# Patient Record
Sex: MALE | Race: WHITE | NOT HISPANIC OR LATINO | Employment: FULL TIME | URBAN - METROPOLITAN AREA
[De-identification: names, ages, dates, MRNs, and addresses within clinical notes are randomized per-mention and may not be internally consistent; named-entity substitution may affect disease eponyms.]

---

## 2023-09-18 PROBLEM — E10.42 TYPE 1 DIABETES MELLITUS WITH DIABETIC POLYNEUROPATHY (MULTI): Status: ACTIVE | Noted: 2023-09-18

## 2023-09-18 PROBLEM — E78.2 MIXED HYPERLIPIDEMIA: Status: ACTIVE | Noted: 2023-09-18

## 2023-09-18 PROBLEM — I10 ESSENTIAL (PRIMARY) HYPERTENSION: Status: ACTIVE | Noted: 2023-09-18

## 2023-09-18 PROBLEM — E78.00 PURE HYPERCHOLESTEROLEMIA: Status: ACTIVE | Noted: 2023-09-18

## 2023-09-18 PROBLEM — E55.9 VITAMIN D DEFICIENCY: Status: ACTIVE | Noted: 2023-09-18

## 2023-09-18 PROBLEM — E10.65 TYPE 1 DIABETES MELLITUS WITH HYPERGLYCEMIA (MULTI): Status: ACTIVE | Noted: 2023-09-18

## 2023-09-18 RX ORDER — INSULIN LISPRO 100 [IU]/ML
150 INJECTION, SOLUTION INTRAVENOUS; SUBCUTANEOUS
COMMUNITY

## 2023-09-18 RX ORDER — ACETAMINOPHEN 500 MG
1 TABLET ORAL DAILY
COMMUNITY
Start: 2021-05-04

## 2023-09-18 RX ORDER — BLOOD SUGAR DIAGNOSTIC
STRIP MISCELLANEOUS
COMMUNITY
Start: 2019-11-29

## 2023-09-18 RX ORDER — PRAVASTATIN SODIUM 20 MG/1
1 TABLET ORAL DAILY
COMMUNITY

## 2023-09-18 RX ORDER — ATORVASTATIN CALCIUM 40 MG/1
1 TABLET, FILM COATED ORAL DAILY
COMMUNITY

## 2023-09-18 RX ORDER — LISINOPRIL 10 MG/1
1 TABLET ORAL DAILY
COMMUNITY

## 2023-09-18 RX ORDER — INSULIN GLULISINE 100 [IU]/ML
INJECTION, SOLUTION SUBCUTANEOUS
COMMUNITY

## 2023-09-18 RX ORDER — GABAPENTIN 300 MG/1
1 CAPSULE ORAL NIGHTLY
COMMUNITY

## 2023-10-27 ENCOUNTER — PHARMACY VISIT (OUTPATIENT)
Dept: PHARMACY | Facility: CLINIC | Age: 38
End: 2023-10-27
Payer: COMMERCIAL

## 2023-10-27 PROCEDURE — RXMED WILLOW AMBULATORY MEDICATION CHARGE

## 2023-11-20 ENCOUNTER — PHARMACY VISIT (OUTPATIENT)
Dept: PHARMACY | Facility: CLINIC | Age: 38
End: 2023-11-20
Payer: COMMERCIAL

## 2023-11-21 ENCOUNTER — PHARMACY VISIT (OUTPATIENT)
Dept: PHARMACY | Facility: CLINIC | Age: 38
End: 2023-11-21
Payer: COMMERCIAL

## 2023-11-21 PROCEDURE — RXMED WILLOW AMBULATORY MEDICATION CHARGE

## 2023-12-18 PROCEDURE — RXMED WILLOW AMBULATORY MEDICATION CHARGE

## 2023-12-26 ENCOUNTER — PHARMACY VISIT (OUTPATIENT)
Dept: PHARMACY | Facility: CLINIC | Age: 38
End: 2023-12-26
Payer: COMMERCIAL

## 2024-01-15 PROCEDURE — RXMED WILLOW AMBULATORY MEDICATION CHARGE

## 2024-01-24 ENCOUNTER — PHARMACY VISIT (OUTPATIENT)
Dept: PHARMACY | Facility: CLINIC | Age: 39
End: 2024-01-24
Payer: COMMERCIAL

## 2024-02-12 PROCEDURE — RXMED WILLOW AMBULATORY MEDICATION CHARGE

## 2024-02-19 ENCOUNTER — PHARMACY VISIT (OUTPATIENT)
Dept: PHARMACY | Facility: CLINIC | Age: 39
End: 2024-02-19
Payer: COMMERCIAL

## 2024-02-25 NOTE — PROGRESS NOTES
HPI    40 yo previously of Dr. Serrano's practice with Diabetes 1 (dx 2004)(+ neuropathy), HTN, Dyslipidemia. A1c was 6.9%, today 6.6%.            Pt is testing sugars 7 times per day using cgm. Pt is having low sugars <1 times/week usually with activity. Pt is able to afford their medications. Pt is exercising. Pt has gone from a weight of nearly 365lbs to his current wt 250lbs range.           Using medtronic 780G with guardian 4 sensor in automode (was in the upper 7's prior to this current pump), pt has 3 previous medtronic pumps.           I:C ratio 1:2.5, isf-10, target , basal: mn-1.95, 5pm-2.0           30days cgm data: 78% in range, 0% lows, pattern: mid 100's through day, quite consistent. In automode 95% of the time.           Taking lisinopril 20mg daily.           Was taking atorvastatin 40mg daily for lipid, having headache so gave trial of pravastatin 20mg and tolerating.                  Current Outpatient Medications:     atorvastatin (Lipitor) 40 mg tablet, Take 1 tablet (40 mg) by mouth once daily. For 90 days, Disp: , Rfl:     blood sugar diagnostic (Contour Next Test Strips) strip, use to test blood sugar 4 times per day In Vitro Dx: E10.65, Disp: , Rfl:     cholecalciferol (Vitamin D-3) 50 mcg (2,000 unit) capsule, Take 1 capsule (50 mcg) by mouth early in the morning.. For 30 days, Disp: , Rfl:     gabapentin (Neurontin) 300 mg capsule, Take 1 capsule (300 mg) by mouth once daily at bedtime. For 90 days, Disp: , Rfl:     insulin glulisine (Apidra U-100 Insulin) 100 unit/mL injection, use as directed in insulin pump Subcutaneous Max TDD: 150 units, Disp: , Rfl:     insulin lispro (HumaLOG U-100 Insulin) 100 unit/mL injection, Inject 1.5 mL (150 Units) under the skin. For 27 days, Disp: , Rfl:     insulin lispro (HumaLOG) 100 unit/mL injection, USE AS DIRECTED IN INSULIN PUMP UP  UNITS TOTAL A DAY, Disp: 40 mL, Rfl: 11    lisinopril 10 mg tablet, Take 1 tablet (10 mg) by mouth once  "daily. For 90 days, Disp: , Rfl:     pravastatin (Pravachol) 20 mg tablet, Take 1 tablet (20 mg) by mouth once daily. For 30 days, Disp: , Rfl:       Allergies as of 02/27/2024    (No Known Allergies)         Review of Systems   Cardiology: Lightheadedness-denies.  Chest pain-denies.  Leg edema-denies.  Palpitations-denies.  Respiratory: Cough-denies. Shortness of breath-denies.  Wheezing-denies.  Gastroenterology: Constipation-denies.  Diarrhea-denies.  Heartburn-denies.  Endocrinology: Cold intolerance-denies.  Heat intolerance-denies.  Sweats-denies.  Neurology: Headache-denies.  Tremor-denies.  Neuropathy in extremities-feet at times  Psychology: Low energy-denies.  Irritability-denies.  Sleep disturbances-denies.      /87 (BP Location: Right arm, Patient Position: Sitting)   Pulse 80   Wt 116 kg (256 lb 12.8 oz)   BMI 35.82 kg/m²       Labs:  Lab Results   Component Value Date    WBC 6.3 03/24/2022    NRBC 0 11/03/2020    RBC 4.94 03/24/2022    HGB 15.1 03/24/2022    HCT 44.6 03/24/2022     03/24/2022     Lab Results   Component Value Date    CALCIUM 9.5 03/24/2022    AST 14 03/24/2022    ALKPHOS 74 03/24/2022    BILITOT 0.9 03/24/2022    PROT 7.1 03/24/2022    ALBUMIN 4.2 03/24/2022    GLOB 3.0 11/03/2020    AGR 1.4 (L) 11/03/2020     03/24/2022    K 4.4 03/24/2022     03/24/2022    CO2 29 03/24/2022    ANIONGAP 10 03/24/2022    BUN 10 03/24/2022    CREATININE 0.81 03/24/2022    UREACREAUR 17.1 11/03/2020    GLUCOSE 143 (H) 03/24/2022    ALT 20 03/24/2022    EGFR 137 11/03/2020     Lab Results   Component Value Date    CHOL 137 03/24/2022    TRIG 57 03/24/2022    HDL 40.5 03/24/2022    LDLCALC 56 (L) 11/03/2020     Lab Results   Component Value Date    MICROALBCREA SEE COMMENT 03/24/2022     Lab Results   Component Value Date    TSH 0.79 03/24/2022     No results found for: \"[id number]\"  Lab Results   Component Value Date    HGBA1C 6.6 (A) 02/27/2024         Physical Exam   General " Appearance: pleasant, cooperative, no acute distress  HEENT: no chemosis, no proptosis, no lid lag, no lid retraction  Neck: no lymphadenopathy, no thyromegaly, no dominant thyroid nodules  Heart: no murmurs, regular rate and rhythm, S1 and S2  Lungs: no wheezes, no rhonci, no rales  Extremities: no lower extremity swelling      Assessment/Plan   1. Type 1 diabetes mellitus with diabetic polyneuropathy (CMS/HCC)  -A1c ordered and reviewed  -pump data reviewed  -labs ordered    -doing great on new pump, no changes needed  -still struggling to lose anymore wt  -give trial of ozempic 0.25mg weekly for 4 weeks, then 0.5mg weekly thereafter  -went over risk/benefits/warnings/taught how to inject    2. Essential (primary) hypertension  -at target on therapy    3. Mixed hyperlipidemia  -on statin and tolerating, full labs ordered today      Follow Up:  MIKE Mireles 6 months    Medical Decision Making  Complexity of problem: Chronic illness of diabetes mellitus uncontrolled, progressing  Data analyzed and reviewed: Reviewed prior notes, blood glucose data, labs including HgbA1c, lipids, serum chemistries.  Ordered tests.   Risk of complications and morbidities: Is definite because of use of insulin and risk of hypoglycemia.  Prescription medications reviewed and modifications made.  Compliance assessed.  Addressed social determinants of health including food insecurity.

## 2024-02-27 ENCOUNTER — OFFICE VISIT (OUTPATIENT)
Dept: ENDOCRINOLOGY | Facility: CLINIC | Age: 39
End: 2024-02-27
Payer: COMMERCIAL

## 2024-02-27 VITALS
BODY MASS INDEX: 35.82 KG/M2 | DIASTOLIC BLOOD PRESSURE: 87 MMHG | HEART RATE: 80 BPM | WEIGHT: 256.8 LBS | SYSTOLIC BLOOD PRESSURE: 133 MMHG

## 2024-02-27 DIAGNOSIS — E10.42 TYPE 1 DIABETES MELLITUS WITH DIABETIC POLYNEUROPATHY (MULTI): Primary | ICD-10-CM

## 2024-02-27 DIAGNOSIS — E78.2 MIXED HYPERLIPIDEMIA: ICD-10-CM

## 2024-02-27 DIAGNOSIS — E10.65 TYPE 1 DIABETES MELLITUS WITH HYPERGLYCEMIA (MULTI): ICD-10-CM

## 2024-02-27 DIAGNOSIS — I10 ESSENTIAL (PRIMARY) HYPERTENSION: ICD-10-CM

## 2024-02-27 LAB — POC HEMOGLOBIN A1C: 6.6 % (ref 4.2–6.5)

## 2024-02-27 PROCEDURE — 3079F DIAST BP 80-89 MM HG: CPT | Performed by: INTERNAL MEDICINE

## 2024-02-27 PROCEDURE — 1036F TOBACCO NON-USER: CPT | Performed by: INTERNAL MEDICINE

## 2024-02-27 PROCEDURE — 99214 OFFICE O/P EST MOD 30 MIN: CPT | Performed by: INTERNAL MEDICINE

## 2024-02-27 PROCEDURE — 3075F SYST BP GE 130 - 139MM HG: CPT | Performed by: INTERNAL MEDICINE

## 2024-02-27 PROCEDURE — 4010F ACE/ARB THERAPY RXD/TAKEN: CPT | Performed by: INTERNAL MEDICINE

## 2024-02-27 PROCEDURE — 83036 HEMOGLOBIN GLYCOSYLATED A1C: CPT | Performed by: INTERNAL MEDICINE

## 2024-02-27 RX ORDER — SEMAGLUTIDE 0.68 MG/ML
0.5 INJECTION, SOLUTION SUBCUTANEOUS
Qty: 3 ML | Refills: 5 | Status: SHIPPED | OUTPATIENT
Start: 2024-02-27

## 2024-02-27 ASSESSMENT — ENCOUNTER SYMPTOMS: DEPRESSION: 0

## 2024-02-27 ASSESSMENT — PAIN SCALES - GENERAL: PAINLEVEL: 0-NO PAIN

## 2024-02-28 PROCEDURE — RXMED WILLOW AMBULATORY MEDICATION CHARGE

## 2024-03-01 ENCOUNTER — LAB (OUTPATIENT)
Dept: LAB | Facility: LAB | Age: 39
End: 2024-03-01
Payer: COMMERCIAL

## 2024-03-01 DIAGNOSIS — E10.42 TYPE 1 DIABETES MELLITUS WITH DIABETIC POLYNEUROPATHY (MULTI): ICD-10-CM

## 2024-03-01 DIAGNOSIS — E78.2 MIXED HYPERLIPIDEMIA: ICD-10-CM

## 2024-03-01 LAB
ALBUMIN SERPL BCP-MCNC: 4.3 G/DL (ref 3.4–5)
ALP SERPL-CCNC: 67 U/L (ref 33–120)
ALT SERPL W P-5'-P-CCNC: 20 U/L (ref 10–52)
ANION GAP SERPL CALC-SCNC: 11 MMOL/L (ref 10–20)
AST SERPL W P-5'-P-CCNC: 14 U/L (ref 9–39)
BASOPHILS # BLD AUTO: 0.09 X10*3/UL (ref 0–0.1)
BASOPHILS NFR BLD AUTO: 1 %
BILIRUB SERPL-MCNC: 0.7 MG/DL (ref 0–1.2)
BUN SERPL-MCNC: 13 MG/DL (ref 6–23)
CALCIUM SERPL-MCNC: 9.1 MG/DL (ref 8.6–10.3)
CHLORIDE SERPL-SCNC: 103 MMOL/L (ref 98–107)
CHOLEST SERPL-MCNC: 151 MG/DL (ref 0–199)
CHOLESTEROL/HDL RATIO: 3.8
CO2 SERPL-SCNC: 30 MMOL/L (ref 21–32)
CREAT SERPL-MCNC: 0.7 MG/DL (ref 0.5–1.3)
CREAT UR-MCNC: 148.4 MG/DL (ref 20–370)
EGFRCR SERPLBLD CKD-EPI 2021: >90 ML/MIN/1.73M*2
EOSINOPHIL # BLD AUTO: 0.04 X10*3/UL (ref 0–0.7)
EOSINOPHIL NFR BLD AUTO: 0.4 %
ERYTHROCYTE [DISTWIDTH] IN BLOOD BY AUTOMATED COUNT: 12.5 % (ref 11.5–14.5)
GLUCOSE SERPL-MCNC: 94 MG/DL (ref 74–99)
HCT VFR BLD AUTO: 45.3 % (ref 41–52)
HDLC SERPL-MCNC: 40.2 MG/DL
HGB BLD-MCNC: 15.3 G/DL (ref 13.5–17.5)
IMM GRANULOCYTES # BLD AUTO: 0.03 X10*3/UL (ref 0–0.7)
IMM GRANULOCYTES NFR BLD AUTO: 0.3 % (ref 0–0.9)
LDLC SERPL CALC-MCNC: 102 MG/DL
LYMPHOCYTES # BLD AUTO: 1.32 X10*3/UL (ref 1.2–4.8)
LYMPHOCYTES NFR BLD AUTO: 14.3 %
MCH RBC QN AUTO: 30.6 PG (ref 26–34)
MCHC RBC AUTO-ENTMCNC: 33.8 G/DL (ref 32–36)
MCV RBC AUTO: 91 FL (ref 80–100)
MICROALBUMIN UR-MCNC: <7 MG/L
MICROALBUMIN/CREAT UR: NORMAL MG/G{CREAT}
MONOCYTES # BLD AUTO: 0.5 X10*3/UL (ref 0.1–1)
MONOCYTES NFR BLD AUTO: 5.4 %
NEUTROPHILS # BLD AUTO: 7.25 X10*3/UL (ref 1.2–7.7)
NEUTROPHILS NFR BLD AUTO: 78.6 %
NON HDL CHOLESTEROL: 111 MG/DL (ref 0–149)
NRBC BLD-RTO: 0 /100 WBCS (ref 0–0)
PLATELET # BLD AUTO: 276 X10*3/UL (ref 150–450)
POTASSIUM SERPL-SCNC: 4.7 MMOL/L (ref 3.5–5.3)
PROT SERPL-MCNC: 7 G/DL (ref 6.4–8.2)
RBC # BLD AUTO: 5 X10*6/UL (ref 4.5–5.9)
SODIUM SERPL-SCNC: 139 MMOL/L (ref 136–145)
TRIGL SERPL-MCNC: 46 MG/DL (ref 0–149)
TSH SERPL-ACNC: 0.53 MIU/L (ref 0.44–3.98)
VLDL: 9 MG/DL (ref 0–40)
WBC # BLD AUTO: 9.2 X10*3/UL (ref 4.4–11.3)

## 2024-03-01 PROCEDURE — 80053 COMPREHEN METABOLIC PANEL: CPT

## 2024-03-01 PROCEDURE — 80061 LIPID PANEL: CPT

## 2024-03-01 PROCEDURE — 36415 COLL VENOUS BLD VENIPUNCTURE: CPT

## 2024-03-01 PROCEDURE — 85025 COMPLETE CBC W/AUTO DIFF WBC: CPT

## 2024-03-01 PROCEDURE — 82570 ASSAY OF URINE CREATININE: CPT

## 2024-03-01 PROCEDURE — 82043 UR ALBUMIN QUANTITATIVE: CPT

## 2024-03-01 PROCEDURE — 84443 ASSAY THYROID STIM HORMONE: CPT

## 2024-03-11 PROCEDURE — RXMED WILLOW AMBULATORY MEDICATION CHARGE

## 2024-03-18 ENCOUNTER — PHARMACY VISIT (OUTPATIENT)
Dept: PHARMACY | Facility: CLINIC | Age: 39
End: 2024-03-18
Payer: COMMERCIAL

## 2024-04-01 ENCOUNTER — PHARMACY VISIT (OUTPATIENT)
Dept: PHARMACY | Facility: CLINIC | Age: 39
End: 2024-04-01
Payer: COMMERCIAL

## 2024-04-11 ENCOUNTER — PHARMACY VISIT (OUTPATIENT)
Dept: PHARMACY | Facility: CLINIC | Age: 39
End: 2024-04-11
Payer: COMMERCIAL

## 2024-04-11 PROCEDURE — RXMED WILLOW AMBULATORY MEDICATION CHARGE

## 2024-04-22 PROCEDURE — RXMED WILLOW AMBULATORY MEDICATION CHARGE

## 2024-04-23 ENCOUNTER — PHARMACY VISIT (OUTPATIENT)
Dept: PHARMACY | Facility: CLINIC | Age: 39
End: 2024-04-23
Payer: COMMERCIAL

## 2024-05-13 PROCEDURE — RXMED WILLOW AMBULATORY MEDICATION CHARGE

## 2024-05-15 ENCOUNTER — PHARMACY VISIT (OUTPATIENT)
Dept: PHARMACY | Facility: CLINIC | Age: 39
End: 2024-05-15
Payer: COMMERCIAL

## 2024-05-24 PROCEDURE — RXMED WILLOW AMBULATORY MEDICATION CHARGE

## 2024-05-25 ENCOUNTER — PHARMACY VISIT (OUTPATIENT)
Dept: PHARMACY | Facility: CLINIC | Age: 39
End: 2024-05-25
Payer: COMMERCIAL

## 2024-06-10 PROCEDURE — RXMED WILLOW AMBULATORY MEDICATION CHARGE

## 2024-06-11 ENCOUNTER — PHARMACY VISIT (OUTPATIENT)
Dept: PHARMACY | Facility: CLINIC | Age: 39
End: 2024-06-11
Payer: COMMERCIAL

## 2024-06-19 PROCEDURE — RXMED WILLOW AMBULATORY MEDICATION CHARGE

## 2024-06-20 ENCOUNTER — PHARMACY VISIT (OUTPATIENT)
Dept: PHARMACY | Facility: CLINIC | Age: 39
End: 2024-06-20
Payer: COMMERCIAL

## 2024-06-26 ENCOUNTER — OFFICE VISIT (OUTPATIENT)
Dept: ENDOCRINOLOGY | Facility: CLINIC | Age: 39
End: 2024-06-26
Payer: COMMERCIAL

## 2024-06-26 VITALS
HEART RATE: 79 BPM | SYSTOLIC BLOOD PRESSURE: 118 MMHG | HEIGHT: 71 IN | DIASTOLIC BLOOD PRESSURE: 84 MMHG | WEIGHT: 237.8 LBS | BODY MASS INDEX: 33.29 KG/M2

## 2024-06-26 DIAGNOSIS — I10 ESSENTIAL (PRIMARY) HYPERTENSION: ICD-10-CM

## 2024-06-26 DIAGNOSIS — E10.42 TYPE 1 DIABETES MELLITUS WITH DIABETIC POLYNEUROPATHY (MULTI): Primary | ICD-10-CM

## 2024-06-26 DIAGNOSIS — E78.2 MIXED HYPERLIPIDEMIA: ICD-10-CM

## 2024-06-26 LAB — POC HEMOGLOBIN A1C: 6.4 % (ref 4.2–6.5)

## 2024-06-26 PROCEDURE — 3079F DIAST BP 80-89 MM HG: CPT | Performed by: INTERNAL MEDICINE

## 2024-06-26 PROCEDURE — 1036F TOBACCO NON-USER: CPT | Performed by: INTERNAL MEDICINE

## 2024-06-26 PROCEDURE — 3049F LDL-C 100-129 MG/DL: CPT | Performed by: INTERNAL MEDICINE

## 2024-06-26 PROCEDURE — 99214 OFFICE O/P EST MOD 30 MIN: CPT | Performed by: INTERNAL MEDICINE

## 2024-06-26 PROCEDURE — 83036 HEMOGLOBIN GLYCOSYLATED A1C: CPT | Performed by: INTERNAL MEDICINE

## 2024-06-26 PROCEDURE — 3062F POS MACROALBUMINURIA REV: CPT | Performed by: INTERNAL MEDICINE

## 2024-06-26 PROCEDURE — 3074F SYST BP LT 130 MM HG: CPT | Performed by: INTERNAL MEDICINE

## 2024-06-26 PROCEDURE — 4010F ACE/ARB THERAPY RXD/TAKEN: CPT | Performed by: INTERNAL MEDICINE

## 2024-06-26 RX ORDER — SEMAGLUTIDE 1.34 MG/ML
1 INJECTION, SOLUTION SUBCUTANEOUS
Qty: 3 ML | Refills: 5 | Status: SHIPPED | OUTPATIENT
Start: 2024-06-26

## 2024-06-26 ASSESSMENT — PAIN SCALES - GENERAL: PAINLEVEL: 0-NO PAIN

## 2024-06-26 ASSESSMENT — ENCOUNTER SYMPTOMS
LOSS OF SENSATION IN FEET: 0
OCCASIONAL FEELINGS OF UNSTEADINESS: 0
DEPRESSION: 0

## 2024-06-26 ASSESSMENT — PATIENT HEALTH QUESTIONNAIRE - PHQ9
SUM OF ALL RESPONSES TO PHQ9 QUESTIONS 1 & 2: 0
2. FEELING DOWN, DEPRESSED OR HOPELESS: NOT AT ALL
1. LITTLE INTEREST OR PLEASURE IN DOING THINGS: NOT AT ALL

## 2024-06-26 NOTE — PROGRESS NOTES
HPI   38 yo previously of Dr. Serrano's practice with Diabetes 1 (dx 2004)(+ neuropathy), HTN, Dyslipidemia. A1c was 6.6%, today 6.4%.            Pt is testing sugars 7 times per day using cgm. Pt is having low sugars <1 times/week usually with activity. Pt is able to afford their medications. Pt is exercising. Pt has gone from a weight of nearly 365lbs at one point.           Using medtronic 780G with guardian 4 sensor in automode (was in the upper 7's prior to this current pump), pt has 3 previous medtronic pumps.  -ozempic 0.5mg weekly (lost 20 lbs since last visit)  -cost of pump supplies is an issue           I:C ratio 1:2.5, isf-10, target , basal: mn-1.95, 5pm-2.0              Taking lisinopril 20mg daily.           Was taking atorvastatin 40mg daily for lipid, having headache so gave trial of pravastatin 20mg and tolerating.                  Current Outpatient Medications:     atorvastatin (Lipitor) 40 mg tablet, Take 1 tablet (40 mg) by mouth once daily. For 90 days, Disp: , Rfl:     blood sugar diagnostic (Contour Next Test Strips) strip, use to test blood sugar 4 times per day In Vitro Dx: E10.65, Disp: , Rfl:     cholecalciferol (Vitamin D-3) 50 mcg (2,000 unit) capsule, Take 1 capsule (50 mcg) by mouth early in the morning.. For 30 days, Disp: , Rfl:     gabapentin (Neurontin) 300 mg capsule, Take 1 capsule (300 mg) by mouth once daily at bedtime. For 90 days, Disp: , Rfl:     insulin lispro (HumaLOG U-100 Insulin) 100 unit/mL injection, Inject 1.5 mL (150 Units) under the skin. For 27 days, Disp: , Rfl:     lisinopril 10 mg tablet, Take 1 tablet (10 mg) by mouth once daily. For 90 days, Disp: , Rfl:     pravastatin (Pravachol) 20 mg tablet, Take 1 tablet (20 mg) by mouth once daily. For 30 days, Disp: , Rfl:     insulin glulisine (Apidra U-100 Insulin) 100 unit/mL injection, use as directed in insulin pump Subcutaneous Max TDD: 150 units, Disp: , Rfl:     insulin lispro (HumaLOG) 100 unit/mL  "injection, USE AS DIRECTED IN INSULIN PUMP UP  UNITS TOTAL A DAY (Patient not taking: Reported on 6/26/2024), Disp: 40 mL, Rfl: 11    semaglutide (Ozempic) 1 mg/dose (4 mg/3 mL) pen injector, Inject 1 mg under the skin every 7 days., Disp: 3 mL, Rfl: 5      Allergies as of 06/26/2024    (No Known Allergies)         Review of Systems   Cardiology: Lightheadedness-denies.  Chest pain-denies.  Leg edema-denies.  Palpitations-denies.  Respiratory: Cough-denies. Shortness of breath-denies.  Wheezing-denies.  Gastroenterology: Constipation-denies.  Diarrhea-denies.  Heartburn-denies.  Endocrinology: Cold intolerance-denies.  Heat intolerance-denies.  Sweats-denies.  Neurology: Headache-denies.  Tremor-denies.  Neuropathy in extremities-denies.  Psychology: Low energy-denies.  Irritability-denies.  Sleep disturbances-denies.      /84 (BP Location: Left arm)   Pulse 79   Ht 1.803 m (5' 11\")   Wt 108 kg (237 lb 12.8 oz)   BMI 33.17 kg/m²       Labs:  Lab Results   Component Value Date    WBC 9.2 03/01/2024    NRBC 0.0 03/01/2024    RBC 5.00 03/01/2024    HGB 15.3 03/01/2024    HCT 45.3 03/01/2024     03/01/2024     Lab Results   Component Value Date    CALCIUM 9.1 03/01/2024    AST 14 03/01/2024    ALKPHOS 67 03/01/2024    BILITOT 0.7 03/01/2024    PROT 7.0 03/01/2024    ALBUMIN 4.3 03/01/2024    GLOB 3.0 11/03/2020    AGR 1.4 (L) 11/03/2020     03/01/2024    K 4.7 03/01/2024     03/01/2024    CO2 30 03/01/2024    ANIONGAP 11 03/01/2024    BUN 13 03/01/2024    CREATININE 0.70 03/01/2024    UREACREAUR 17.1 11/03/2020    GLUCOSE 94 03/01/2024    ALT 20 03/01/2024    EGFR >90 03/01/2024     Lab Results   Component Value Date    CHOL 151 03/01/2024    TRIG 46 03/01/2024    HDL 40.2 03/01/2024    LDLCALC 102 (H) 03/01/2024     Lab Results   Component Value Date    MICROALBCREA  03/01/2024      Comment:      One or more analytes used in this calculation is outside of the analytical measurement " "range. Calculation cannot be performed.     Lab Results   Component Value Date    TSH 0.53 03/01/2024     No results found for: \"[id number]\"  Lab Results   Component Value Date    HGBA1C 6.4 06/26/2024         Physical Exam   General Appearance: pleasant, cooperative, no acute distress  HEENT: no chemosis, no proptosis, no lid lag, no lid retraction  Neck: no lymphadenopathy, no thyromegaly, no dominant thyroid nodules  Heart: no murmurs, regular rate and rhythm, S1 and S2  Lungs: no wheezes, no rhonci, no rales  Extremities: no lower extremity swelling      Assessment/Plan   1. Type 1 diabetes mellitus with diabetic polyneuropathy (Multi)  -A1c ordered and reviewed  -cgm data reviewed  -labs reviewed    -overall doing well, nice wt loss on ozempic, increase to 1mg weekly   -if cost of pump supplies is an issue can use lantus 60 units every day and humalog 1:2.5, isf-10 (of note pt recalls being on as much as 75 units basal in the past)    2. Essential (primary) hypertension  -on therapy, will follow    3. Mixed hyperlipidemia  -on statin, low threshold to increase pravastatin, labs reviewed will follow         Follow Up:  Michael 30 minutes 6 months    Medical Decision Making  Complexity of problem: Chronic illness of diabetes mellitus uncontrolled, progressing  Data analyzed and reviewed: Reviewed prior notes, blood glucose data, labs including HgbA1c, lipids, serum chemistries.  Ordered tests.   Risk of complications and morbidities: Is definite because of use of insulin and risk of hypoglycemia.  Prescription medications reviewed and modifications made.  Compliance assessed.  Addressed social determinants of health including food insecurity.       "

## 2024-07-01 DIAGNOSIS — E10.42 TYPE 1 DIABETES MELLITUS WITH DIABETIC POLYNEUROPATHY (MULTI): Primary | ICD-10-CM

## 2024-07-01 PROCEDURE — RXMED WILLOW AMBULATORY MEDICATION CHARGE

## 2024-07-01 RX ORDER — INSULIN GLARGINE 300 [IU]/ML
60 INJECTION, SOLUTION SUBCUTANEOUS NIGHTLY
Qty: 18 ML | Refills: 1 | Status: SHIPPED | OUTPATIENT
Start: 2024-07-01

## 2024-07-02 ENCOUNTER — PHARMACY VISIT (OUTPATIENT)
Dept: PHARMACY | Facility: CLINIC | Age: 39
End: 2024-07-02
Payer: COMMERCIAL

## 2024-07-03 DIAGNOSIS — E10.42 TYPE 1 DIABETES MELLITUS WITH DIABETIC POLYNEUROPATHY (MULTI): Primary | ICD-10-CM

## 2024-07-03 RX ORDER — INSULIN LISPRO 100 [IU]/ML
INJECTION, SOLUTION INTRAVENOUS; SUBCUTANEOUS
Qty: 40 ML | Refills: 11 | Status: SHIPPED | OUTPATIENT
Start: 2024-07-03 | End: 2025-07-03

## 2024-07-18 PROCEDURE — RXMED WILLOW AMBULATORY MEDICATION CHARGE

## 2024-07-22 ENCOUNTER — PHARMACY VISIT (OUTPATIENT)
Dept: PHARMACY | Facility: CLINIC | Age: 39
End: 2024-07-22
Payer: COMMERCIAL

## 2024-08-12 PROCEDURE — RXMED WILLOW AMBULATORY MEDICATION CHARGE

## 2024-08-14 ENCOUNTER — PHARMACY VISIT (OUTPATIENT)
Dept: PHARMACY | Facility: CLINIC | Age: 39
End: 2024-08-14
Payer: COMMERCIAL

## 2024-08-26 NOTE — PROGRESS NOTES
HPI   38 yo previously of Dr. Serrano's practice with Diabetes 1 (dx 2004)(+ neuropathy), HTN, Dyslipidemia. A1c was 6.4%, today 6.3%.            Pt is testing sugars 4 times per day, cgm too expensive. Pt is having low sugars <1 times/week usually with activity.  Am sugars 120-140's, 140-150's at lunch, 140-150's at dinner, bedtime upper 100 range.   Pt is able to afford their medications. Pt is exercising.     Pt has gone from a weight of nearly 365lbs at one point.             -cost of pump supplies is an issue, no longer affording medtronic 780G           I:C ratio 1:2.5, isf-10, target , basal: mn-1.95, 5pm-2.0    -as cost of pump supplies is an issue can use lantus 40 units every day and humalog 1:5 at times 1:8, isf-10     -ozempic 1mg q week (increased last visit)         -no longer on 20mg lisinopril, bp stable           Was taking atorvastatin 40mg daily for lipid, having headache so gave trial of pravastatin 20mg and tolerating (pt not taking/no good reason).        Current Outpatient Medications:     atorvastatin (Lipitor) 40 mg tablet, Take 1 tablet (40 mg) by mouth once daily. For 90 days, Disp: , Rfl:     blood sugar diagnostic (Contour Next Test Strips) strip, use to test blood sugar 4 times per day In Vitro Dx: E10.65, Disp: , Rfl:     cholecalciferol (Vitamin D-3) 50 mcg (2,000 unit) capsule, Take 1 capsule (50 mcg) by mouth early in the morning.. For 30 days, Disp: , Rfl:     gabapentin (Neurontin) 300 mg capsule, Take 1 capsule (300 mg) by mouth once daily at bedtime. For 90 days, Disp: , Rfl:     insulin glargine (Toujeo SoloStar U-300 Insulin) 300 unit/mL (1.5 mL) injection, Inject 60 Units under the skin once daily at bedtime. Take as directed per insulin instructions., Disp: 18 mL, Rfl: 1    insulin glulisine (Apidra U-100 Insulin) 100 unit/mL injection, use as directed in insulin pump Subcutaneous Max TDD: 150 units, Disp: , Rfl:     insulin lispro (HumaLOG U-100 Insulin) 100 unit/mL  injection, Inject 1.5 mL (150 Units) under the skin. For 27 days, Disp: , Rfl:     insulin lispro (HumaLOG U-100 Insulin) 100 unit/mL injection, USE AS DIRECTED IN INSULIN PUMP UP  UNITS TOTAL A DAY, Disp: 40 mL, Rfl: 11    lisinopril 10 mg tablet, Take 1 tablet (10 mg) by mouth once daily. For 90 days, Disp: , Rfl:     pravastatin (Pravachol) 20 mg tablet, Take 1 tablet (20 mg) by mouth once daily. For 30 days, Disp: , Rfl:     semaglutide (Ozempic) 1 mg/dose (4 mg/3 mL) pen injector, Inject 1 mg under the skin every 7 days., Disp: 3 mL, Rfl: 5      Allergies as of 08/27/2024    (No Known Allergies)         Review of Systems   Cardiology: Lightheadedness-denies.  Chest pain-denies.  Leg edema-denies.  Palpitations-denies.  Respiratory: Cough-denies. Shortness of breath-denies.  Wheezing-denies.  Gastroenterology: Constipation-denies.  Diarrhea-denies.  Heartburn-denies.  Endocrinology: Cold intolerance-denies.  Heat intolerance-denies.  Sweats-denies.  Neurology: Headache-denies.  Tremor-denies.  Neuropathy in extremities-denies.  Psychology: Low energy-denies.  Irritability-denies.  Sleep disturbances-denies.      /77 (BP Location: Left arm, Patient Position: Sitting, BP Cuff Size: Large adult)   Pulse 82   Wt 105 kg (232 lb 3.2 oz)   BMI 32.39 kg/m²       Labs:  Lab Results   Component Value Date    WBC 9.2 03/01/2024    NRBC 0.0 03/01/2024    RBC 5.00 03/01/2024    HGB 15.3 03/01/2024    HCT 45.3 03/01/2024     03/01/2024     Lab Results   Component Value Date    CALCIUM 9.1 03/01/2024    AST 14 03/01/2024    ALKPHOS 67 03/01/2024    BILITOT 0.7 03/01/2024    PROT 7.0 03/01/2024    ALBUMIN 4.3 03/01/2024    GLOB 3.0 11/03/2020    AGR 1.4 (L) 11/03/2020     03/01/2024    K 4.7 03/01/2024     03/01/2024    CO2 30 03/01/2024    ANIONGAP 11 03/01/2024    BUN 13 03/01/2024    CREATININE 0.70 03/01/2024    UREACREAUR 17.1 11/03/2020    GLUCOSE 94 03/01/2024    ALT 20 03/01/2024     "EGFR >90 03/01/2024     Lab Results   Component Value Date    CHOL 151 03/01/2024    TRIG 46 03/01/2024    HDL 40.2 03/01/2024    LDLCALC 102 (H) 03/01/2024     Lab Results   Component Value Date    MICROALBCREA  03/01/2024      Comment:      One or more analytes used in this calculation is outside of the analytical measurement range. Calculation cannot be performed.     Lab Results   Component Value Date    TSH 0.53 03/01/2024     No results found for: \"[id number]\"  Lab Results   Component Value Date    HGBA1C 6.3 08/27/2024         Physical Exam   General Appearance: pleasant, cooperative, no acute distress  HEENT: no chemosis, no proptosis, no lid lag, no lid retraction  Neck: no lymphadenopathy, no thyromegaly, no dominant thyroid nodules  Heart: no murmurs, regular rate and rhythm, S1 and S2  Lungs: no wheezes, no rhonci, no rales  Extremities: no lower extremity swelling      Assessment/Plan   1. Type 1 diabetes mellitus with diabetic polyneuropathy (Multi)  -A1c ordered and reviewed  -glycemic log reviewed  -labs reviewed    -increase to 2mg ozempic q week  -titrate dinner insulin to target bedtime 150 or less  -made aware of alex as a cheaper cgm alternative    2. Essential (primary) hypertension  -at target off therapy, will follow closely    3. Mixed hyperlipidemia  -please go back on pravastatin, rx sent     -should see pcp and or neurology as having chronic migraine headaches    Follow Up:  Michael 6 months    -labs/tests/notes reviewed  -reviewed and counseled patient on medication monitoring and side effects          "

## 2024-08-27 ENCOUNTER — APPOINTMENT (OUTPATIENT)
Dept: ENDOCRINOLOGY | Facility: CLINIC | Age: 39
End: 2024-08-27
Payer: COMMERCIAL

## 2024-08-27 VITALS
WEIGHT: 232.2 LBS | BODY MASS INDEX: 32.39 KG/M2 | HEART RATE: 82 BPM | DIASTOLIC BLOOD PRESSURE: 77 MMHG | SYSTOLIC BLOOD PRESSURE: 109 MMHG

## 2024-08-27 DIAGNOSIS — I10 ESSENTIAL (PRIMARY) HYPERTENSION: ICD-10-CM

## 2024-08-27 DIAGNOSIS — E10.42 TYPE 1 DIABETES MELLITUS WITH DIABETIC POLYNEUROPATHY (MULTI): Primary | ICD-10-CM

## 2024-08-27 DIAGNOSIS — E78.2 MIXED HYPERLIPIDEMIA: ICD-10-CM

## 2024-08-27 LAB — POC HEMOGLOBIN A1C: 6.3 % (ref 4.2–6.5)

## 2024-08-27 PROCEDURE — 3062F POS MACROALBUMINURIA REV: CPT | Performed by: INTERNAL MEDICINE

## 2024-08-27 PROCEDURE — 1036F TOBACCO NON-USER: CPT | Performed by: INTERNAL MEDICINE

## 2024-08-27 PROCEDURE — 83036 HEMOGLOBIN GLYCOSYLATED A1C: CPT | Performed by: INTERNAL MEDICINE

## 2024-08-27 PROCEDURE — 4010F ACE/ARB THERAPY RXD/TAKEN: CPT | Performed by: INTERNAL MEDICINE

## 2024-08-27 PROCEDURE — 3078F DIAST BP <80 MM HG: CPT | Performed by: INTERNAL MEDICINE

## 2024-08-27 PROCEDURE — 99214 OFFICE O/P EST MOD 30 MIN: CPT | Performed by: INTERNAL MEDICINE

## 2024-08-27 PROCEDURE — 3049F LDL-C 100-129 MG/DL: CPT | Performed by: INTERNAL MEDICINE

## 2024-08-27 PROCEDURE — 3074F SYST BP LT 130 MM HG: CPT | Performed by: INTERNAL MEDICINE

## 2024-08-27 PROCEDURE — RXMED WILLOW AMBULATORY MEDICATION CHARGE

## 2024-08-27 RX ORDER — SEMAGLUTIDE 2.68 MG/ML
2 INJECTION, SOLUTION SUBCUTANEOUS
Qty: 9 ML | Refills: 1 | Status: SHIPPED | OUTPATIENT
Start: 2024-08-27

## 2024-08-27 RX ORDER — PRAVASTATIN SODIUM 20 MG/1
20 TABLET ORAL DAILY
Qty: 90 TABLET | Refills: 1 | Status: SHIPPED | OUTPATIENT
Start: 2024-08-27

## 2024-08-27 ASSESSMENT — PAIN SCALES - GENERAL: PAINLEVEL: 2

## 2024-08-27 ASSESSMENT — ENCOUNTER SYMPTOMS: DEPRESSION: 0

## 2024-08-28 ENCOUNTER — PHARMACY VISIT (OUTPATIENT)
Dept: PHARMACY | Facility: CLINIC | Age: 39
End: 2024-08-28
Payer: COMMERCIAL

## 2024-09-03 PROCEDURE — RXMED WILLOW AMBULATORY MEDICATION CHARGE

## 2024-09-05 ENCOUNTER — PHARMACY VISIT (OUTPATIENT)
Dept: PHARMACY | Facility: CLINIC | Age: 39
End: 2024-09-05
Payer: COMMERCIAL

## 2024-11-20 PROCEDURE — RXMED WILLOW AMBULATORY MEDICATION CHARGE

## 2024-11-25 ENCOUNTER — PHARMACY VISIT (OUTPATIENT)
Dept: PHARMACY | Facility: CLINIC | Age: 39
End: 2024-11-25
Payer: COMMERCIAL

## 2024-12-31 ENCOUNTER — APPOINTMENT (OUTPATIENT)
Dept: ENDOCRINOLOGY | Facility: CLINIC | Age: 39
End: 2024-12-31
Payer: COMMERCIAL

## 2025-02-05 DIAGNOSIS — E10.42 TYPE 1 DIABETES MELLITUS WITH DIABETIC POLYNEUROPATHY: ICD-10-CM

## 2025-02-05 PROCEDURE — RXMED WILLOW AMBULATORY MEDICATION CHARGE

## 2025-02-05 RX ORDER — SEMAGLUTIDE 2.68 MG/ML
2 INJECTION, SOLUTION SUBCUTANEOUS
Qty: 9 ML | Refills: 1 | Status: SHIPPED | OUTPATIENT
Start: 2025-02-05

## 2025-02-05 RX ORDER — INSULIN GLARGINE 300 [IU]/ML
60 INJECTION, SOLUTION SUBCUTANEOUS NIGHTLY
Qty: 18 ML | Refills: 1 | Status: SHIPPED | OUTPATIENT
Start: 2025-02-05

## 2025-02-05 NOTE — TELEPHONE ENCOUNTER
We are sending your prescription to [facility] for benefits investigation and affordability support.      If you have any questions or would like to check the status of your prescription(s),  please contact the pharmacy directly at [phone].

## 2025-02-07 ENCOUNTER — PHARMACY VISIT (OUTPATIENT)
Dept: PHARMACY | Facility: CLINIC | Age: 40
End: 2025-02-07
Payer: COMMERCIAL

## 2025-02-26 NOTE — PROGRESS NOTES
HPI   39 yo previously of Dr. Serrano's practice with Diabetes 1 (dx 2004)(+ neuropathy), HTN, Dyslipidemia, highest wt 365 lbs.  A1c was 6.3%, today 6.6%.            Pt is testing sugars 4 times per day, cgm too expensive. Pt is having low sugars <1 times/week usually with activity.  Am sugars 120-130's, 120-130's at lunch, 150'-160s after dinner, bedtime upper 100 range.   Pt is able to afford their medications. Pt is exercising.             -cost of pump supplies is an issue, no longer affording medImprivata 780G    I:C ratio 1:2.5, isf-10, target , basal: mn-1.95, 5pm-2.0     -taking toujeo 40 units every day and humalog 1:5 at times 1:8, isf-10     -ozempic 2mg q week (increased last visit and not covered in 2025)         -no longer on 20mg lisinopril, bp stable      -taking pravastatin 20mg for lipids an tolerating  -headache on atorvastatin               Current Outpatient Medications:     atorvastatin (Lipitor) 40 mg tablet, Take 1 tablet (40 mg) by mouth once daily. For 90 days, Disp: , Rfl:     blood sugar diagnostic (Contour Next Test Strips) strip, use to test blood sugar 4 times per day In Vitro Dx: E10.65, Disp: , Rfl:     cholecalciferol (Vitamin D-3) 50 mcg (2,000 unit) capsule, Take 1 capsule (50 mcg) by mouth early in the morning.. For 30 days, Disp: , Rfl:     gabapentin (Neurontin) 300 mg capsule, Take 1 capsule (300 mg) by mouth once daily at bedtime. For 90 days, Disp: , Rfl:     insulin glargine (Toujeo SoloStar U-300 Insulin) 300 unit/mL (1.5 mL) injection, Inject 60 Units under the skin once daily at bedtime. Take as directed per insulin instructions., Disp: 18 mL, Rfl: 1    insulin lispro (HumaLOG U-100 Insulin) 100 unit/mL injection, USE AS DIRECTED IN INSULIN PUMP UP  UNITS TOTAL A DAY, Disp: 40 mL, Rfl: 11    pravastatin (Pravachol) 20 mg tablet, Take 1 tablet (20 mg) by mouth once daily. For 30 days, Disp: 90 tablet, Rfl: 1      Allergies as of 02/27/2025    (No Known Allergies)  "        Review of Systems   Cardiology: Lightheadedness-denies.  Chest pain-denies.  Leg edema-denies.  Palpitations-denies.  Respiratory: Cough-denies. Shortness of breath-denies.  Wheezing-denies.  Gastroenterology: Constipation-denies.  Diarrhea-denies.  Heartburn-denies.  Endocrinology: Cold intolerance-denies.  Heat intolerance-denies.  Sweats-denies.  Neurology: Headache-denies.  Tremor-denies.  Neuropathy in extremities-denies.  Psychology: Low energy-denies.  Irritability-denies.  Sleep disturbances-denies.      /72 (BP Location: Left arm, Patient Position: Sitting)   Pulse 76   Ht 1.803 m (5' 11\")   Wt 101 kg (222 lb 3.2 oz)   BMI 30.99 kg/m²       Labs:  Lab Results   Component Value Date    WBC 9.2 03/01/2024    NRBC 0.0 03/01/2024    RBC 5.00 03/01/2024    HGB 15.3 03/01/2024    HCT 45.3 03/01/2024     03/01/2024     Lab Results   Component Value Date    CALCIUM 9.1 03/01/2024    AST 14 03/01/2024    ALKPHOS 67 03/01/2024    BILITOT 0.7 03/01/2024    PROT 7.0 03/01/2024    ALBUMIN 4.3 03/01/2024    GLOB 3.0 11/03/2020    AGR 1.4 (L) 11/03/2020     03/01/2024    K 4.7 03/01/2024     03/01/2024    CO2 30 03/01/2024    ANIONGAP 11 03/01/2024    BUN 13 03/01/2024    CREATININE 0.70 03/01/2024    UREACREAUR 17.1 11/03/2020    GLUCOSE 94 03/01/2024    ALT 20 03/01/2024    EGFR >90 03/01/2024     Lab Results   Component Value Date    CHOL 151 03/01/2024    TRIG 46 03/01/2024    HDL 40.2 03/01/2024    LDLCALC 102 (H) 03/01/2024     Lab Results   Component Value Date    MICROALBCREA  03/01/2024      Comment:      One or more analytes used in this calculation is outside of the analytical measurement range. Calculation cannot be performed.     Lab Results   Component Value Date    TSH 0.53 03/01/2024     No results found for: \"[id number]\"  Lab Results   Component Value Date    HGBA1C 6.6 (A) 02/27/2025         Physical Exam   General Appearance: pleasant, cooperative, no acute " distress  HEENT: no chemosis, no proptosis, no lid lag, no lid retraction  Neck: no lymphadenopathy, no thyromegaly, no dominant thyroid nodules  Heart: no murmurs, regular rate and rhythm, S1 and S2  Lungs: no wheezes, no rhonci, no rales  Extremities: no lower extremity swelling      Assessment/Plan   1. Type 1 diabetes mellitus with diabetic polyneuropathy (Primary)  -A1c ordered and reviewed  -glycemic log reviewed  -labs reviewed    -ozempic no longer covered (other weekly glp-1RA require PA suggesting they are not covered unless dm2)  -call for cgm, would benefit from  -pt asking about omnipod, he can look into, would rather see him start with cgm first    2. Essential (primary) hypertension  -at target after wt loss, low threshold to add back lisinopril if needed    3. Mixed hyperlipidemia  -on statin and tolerating, labs reviewed, no change         Follow Up:  Michael 6 months    Medical Decision Making  Complexity of problem: Chronic illness of diabetes mellitus uncontrolled, progressing  Data analyzed and reviewed: Reviewed prior notes, blood glucose data, labs including HgbA1c, lipids, serum chemistries.  Ordered tests.   Risk of complications and morbidities: Is definite because of use of insulin and risk of hypoglycemia.  Prescription medications reviewed and modifications made.  Compliance assessed.  Addressed social determinants of health including food insecurity.

## 2025-02-27 ENCOUNTER — APPOINTMENT (OUTPATIENT)
Dept: ENDOCRINOLOGY | Facility: CLINIC | Age: 40
End: 2025-02-27
Payer: COMMERCIAL

## 2025-02-27 VITALS
HEART RATE: 76 BPM | HEIGHT: 71 IN | DIASTOLIC BLOOD PRESSURE: 72 MMHG | BODY MASS INDEX: 31.11 KG/M2 | WEIGHT: 222.2 LBS | SYSTOLIC BLOOD PRESSURE: 113 MMHG

## 2025-02-27 DIAGNOSIS — E10.42 TYPE 1 DIABETES MELLITUS WITH DIABETIC POLYNEUROPATHY: Primary | ICD-10-CM

## 2025-02-27 DIAGNOSIS — E78.2 MIXED HYPERLIPIDEMIA: ICD-10-CM

## 2025-02-27 DIAGNOSIS — I10 ESSENTIAL (PRIMARY) HYPERTENSION: ICD-10-CM

## 2025-02-27 LAB — POC HEMOGLOBIN A1C: 6.6 % (ref 4.2–6.5)

## 2025-02-27 PROCEDURE — 83036 HEMOGLOBIN GLYCOSYLATED A1C: CPT | Performed by: INTERNAL MEDICINE

## 2025-02-27 PROCEDURE — 3074F SYST BP LT 130 MM HG: CPT | Performed by: INTERNAL MEDICINE

## 2025-02-27 PROCEDURE — 1036F TOBACCO NON-USER: CPT | Performed by: INTERNAL MEDICINE

## 2025-02-27 PROCEDURE — 99214 OFFICE O/P EST MOD 30 MIN: CPT | Performed by: INTERNAL MEDICINE

## 2025-02-27 PROCEDURE — 3078F DIAST BP <80 MM HG: CPT | Performed by: INTERNAL MEDICINE

## 2025-02-27 PROCEDURE — 3008F BODY MASS INDEX DOCD: CPT | Performed by: INTERNAL MEDICINE

## 2025-02-27 ASSESSMENT — ENCOUNTER SYMPTOMS
DEPRESSION: 0
LOSS OF SENSATION IN FEET: 0
OCCASIONAL FEELINGS OF UNSTEADINESS: 0

## 2025-02-27 ASSESSMENT — PATIENT HEALTH QUESTIONNAIRE - PHQ9
1. LITTLE INTEREST OR PLEASURE IN DOING THINGS: NOT AT ALL
2. FEELING DOWN, DEPRESSED OR HOPELESS: NOT AT ALL
SUM OF ALL RESPONSES TO PHQ9 QUESTIONS 1 & 2: 0

## 2025-02-27 ASSESSMENT — PAIN SCALES - GENERAL: PAINLEVEL_OUTOF10: 0-NO PAIN

## 2025-08-13 PROCEDURE — RXMED WILLOW AMBULATORY MEDICATION CHARGE

## 2025-08-16 ENCOUNTER — PHARMACY VISIT (OUTPATIENT)
Dept: PHARMACY | Facility: CLINIC | Age: 40
End: 2025-08-16
Payer: COMMERCIAL

## 2025-08-27 ENCOUNTER — APPOINTMENT (OUTPATIENT)
Dept: ENDOCRINOLOGY | Facility: CLINIC | Age: 40
End: 2025-08-27
Payer: COMMERCIAL

## 2025-08-27 VITALS
SYSTOLIC BLOOD PRESSURE: 136 MMHG | WEIGHT: 242.8 LBS | DIASTOLIC BLOOD PRESSURE: 78 MMHG | BODY MASS INDEX: 33.86 KG/M2 | HEART RATE: 67 BPM

## 2025-08-27 DIAGNOSIS — E10.42 TYPE 1 DIABETES MELLITUS WITH DIABETIC POLYNEUROPATHY: Primary | ICD-10-CM

## 2025-08-27 DIAGNOSIS — I10 ESSENTIAL (PRIMARY) HYPERTENSION: ICD-10-CM

## 2025-08-27 DIAGNOSIS — G43.809 OTHER MIGRAINE WITHOUT STATUS MIGRAINOSUS, NOT INTRACTABLE: ICD-10-CM

## 2025-08-27 DIAGNOSIS — E78.2 MIXED HYPERLIPIDEMIA: ICD-10-CM

## 2025-08-27 LAB — POC HEMOGLOBIN A1C: 7.4 % (ref 4.2–6.5)

## 2025-08-27 PROCEDURE — 3051F HG A1C>EQUAL 7.0%<8.0%: CPT | Performed by: INTERNAL MEDICINE

## 2025-08-27 PROCEDURE — 99214 OFFICE O/P EST MOD 30 MIN: CPT | Performed by: INTERNAL MEDICINE

## 2025-08-27 PROCEDURE — 3078F DIAST BP <80 MM HG: CPT | Performed by: INTERNAL MEDICINE

## 2025-08-27 PROCEDURE — RXMED WILLOW AMBULATORY MEDICATION CHARGE

## 2025-08-27 PROCEDURE — 83036 HEMOGLOBIN GLYCOSYLATED A1C: CPT | Performed by: INTERNAL MEDICINE

## 2025-08-27 PROCEDURE — 3075F SYST BP GE 130 - 139MM HG: CPT | Performed by: INTERNAL MEDICINE

## 2025-08-27 PROCEDURE — 1036F TOBACCO NON-USER: CPT | Performed by: INTERNAL MEDICINE

## 2025-08-27 RX ORDER — SUMATRIPTAN SUCCINATE 50 MG/1
50 TABLET ORAL ONCE AS NEEDED
Qty: 9 TABLET | Refills: 11 | Status: SHIPPED | OUTPATIENT
Start: 2025-08-27

## 2025-08-27 RX ORDER — BLOOD-GLUCOSE SENSOR
EACH MISCELLANEOUS
Qty: 2 EACH | Refills: 11 | Status: SHIPPED | OUTPATIENT
Start: 2025-08-27

## 2025-08-27 ASSESSMENT — ENCOUNTER SYMPTOMS
LOSS OF SENSATION IN FEET: 0
DEPRESSION: 0

## 2025-08-27 ASSESSMENT — PAIN SCALES - GENERAL: PAINLEVEL_OUTOF10: 0-NO PAIN

## 2025-08-29 ENCOUNTER — PHARMACY VISIT (OUTPATIENT)
Dept: PHARMACY | Facility: CLINIC | Age: 40
End: 2025-08-29
Payer: COMMERCIAL